# Patient Record
Sex: MALE | Race: BLACK OR AFRICAN AMERICAN | NOT HISPANIC OR LATINO | URBAN - METROPOLITAN AREA
[De-identification: names, ages, dates, MRNs, and addresses within clinical notes are randomized per-mention and may not be internally consistent; named-entity substitution may affect disease eponyms.]

---

## 2022-08-13 ENCOUNTER — HOSPITAL ENCOUNTER (EMERGENCY)
Facility: OTHER | Age: 32
Discharge: HOME | End: 2022-08-13
Attending: EMERGENCY MEDICINE
Payer: COMMERCIAL

## 2022-08-13 VITALS
SYSTOLIC BLOOD PRESSURE: 134 MMHG | TEMPERATURE: 98 F | BODY MASS INDEX: 45.1 KG/M2 | RESPIRATION RATE: 16 BRPM | DIASTOLIC BLOOD PRESSURE: 73 MMHG | OXYGEN SATURATION: 97 % | HEART RATE: 77 BPM | HEIGHT: 70 IN | WEIGHT: 315 LBS

## 2022-08-13 DIAGNOSIS — S61.213A LACERATION OF LEFT MIDDLE FINGER WITHOUT FOREIGN BODY WITHOUT DAMAGE TO NAIL, INITIAL ENCOUNTER: Primary | ICD-10-CM
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
## 2022-08-13 PROCEDURE — 99284 EMERGENCY DEPT VISIT MOD MDM: CPT

## 2022-08-13 PROCEDURE — 63600175 PHARM REV CODE 636 W HCPCS: Performed by: EMERGENCY MEDICINE

## 2022-08-13 PROCEDURE — 90715 TDAP VACCINE 7 YRS/> IM: CPT | Performed by: EMERGENCY MEDICINE

## 2022-08-13 PROCEDURE — 90471 IMMUNIZATION ADMIN: CPT | Performed by: EMERGENCY MEDICINE

## 2022-08-13 RX ORDER — AMOXICILLIN AND CLAVULANATE POTASSIUM 875; 125 MG/1; MG/1
1 TABLET, FILM COATED ORAL 2 TIMES DAILY
Qty: 8 TABLET | Refills: 0 | Status: SHIPPED | OUTPATIENT
Start: 2022-08-13 | End: 2022-08-17

## 2022-08-13 RX ADMIN — CLOSTRIDIUM TETANI TOXOID ANTIGEN (FORMALDEHYDE INACTIVATED), CORYNEBACTERIUM DIPHTHERIAE TOXOID ANTIGEN (FORMALDEHYDE INACTIVATED), BORDETELLA PERTUSSIS TOXOID ANTIGEN (GLUTARALDEHYDE INACTIVATED), BORDETELLA PERTUSSIS FILAMENTOUS HEMAGGLUTININ ANTIGEN (FORMALDEHYDE INACTIVATED), BORDETELLA PERTUSSIS PERTACTIN ANTIGEN, AND BORDETELLA PERTUSSIS FIMBRIAE 2/3 ANTIGEN 0.5 ML: 5; 2; 2.5; 5; 3; 5 INJECTION, SUSPENSION INTRAMUSCULAR at 01:08

## 2022-08-13 NOTE — ED PROVIDER NOTES
Encounter Date: 8/13/2022       History     Chief Complaint   Patient presents with    Laceration     C/o LAC to left middle finger s/p dog bite this am. 2 mm LAC noted to left middle finger. Bleeding controlled. Stated dog up to date with all vaccinations.     Animal Bite     The patient is a 32-year-old male who presents with a wound on his left middle finger just superior to the PIP joint dorsally.  No bleeding, the wound was caused by his dog biting him.  He states it is a Corgie and he was doing leash training.         Review of patient's allergies indicates:  No Known Allergies  History reviewed. No pertinent past medical history.  History reviewed. No pertinent surgical history.  History reviewed. No pertinent family history.     Review of Systems   Constitutional: Negative for activity change, appetite change and fever.   Endocrine: Negative for polydipsia and polyphagia.   Musculoskeletal: Negative for arthralgias and myalgias.       Physical Exam     Initial Vitals [08/13/22 1154]   BP Pulse Resp Temp SpO2   132/68 80 18 98.2 °F (36.8 °C) 96 %      MAP       --         Physical Exam    Nursing note and vitals reviewed.  Constitutional: He appears well-developed and well-nourished.   HENT:   Head: Normocephalic and atraumatic.   Musculoskeletal:      Comments: 1.5 cm laceration just superior to the left middle finger PIP joint.  When the finger is straight, the wound is slightly gaping open, there is no joint exposure, no extensor tendon exposure or damage.  Finger with full range of motion and neurovascularly intact.     Skin: Skin is warm and dry.   Psychiatric: He has a normal mood and affect. His behavior is normal. Judgment and thought content normal.         ED Course   Procedures  Labs Reviewed - No data to display       Imaging Results    None          Medications   Tdap vaccine injection 0.5 mL (has no administration in time range)                 ED Course as of 08/13/22 1339   Sat Aug 13, 2022    2534 Shared decision making was used.  I feel that the wound does not need to be repaired since it is a dog bite and is a fairly deep wound.  There is no joint exposure or extensor tendon damage however the wound will be thoroughly cleaned and irrigated in the emergency department, tetanus will be given in the patient will be discharged with a prescription for Augmentin.  The patient does not have a history of diabetes. [ST]      ED Course User Index  [ST] Kacy Orellana MD             Clinical Impression:   Final diagnoses:  [S61.213A] Laceration of left middle finger without foreign body without damage to nail, initial encounter (Primary)  [W54.0XXA] Dog bite, initial encounter          ED Disposition Condition    Discharge Stable        ED Prescriptions     Medication Sig Dispense Start Date End Date Auth. Provider    amoxicillin-clavulanate 875-125mg (AUGMENTIN) 875-125 mg per tablet Take 1 tablet by mouth 2 (two) times daily. for 4 days 8 tablet 8/13/2022 8/17/2022 Kacy Orellana MD        Follow-up Information     Follow up With Specialties Details Why Contact Info    [facility]  Schedule an appointment as soon as possible for a visit  [facility] Referral [facility] (Tel: [phone]) [address] [facility] [address]           Kacy Orellana MD  08/13/22 7616

## 2022-08-13 NOTE — ED NOTES
Reports dog bite to L hand, small lac to L digit, bleeding controlled. Dog up to date on vaccines. PT unsure of last tetanus. Hx of asthma

## 2022-08-13 NOTE — DISCHARGE INSTRUCTIONS
Take Augmentin twice a day for 4 days.  Return to the emergency department for any infection.  This includes redness, swelling, yellow milky drainage, increased pain.   Keep the wound clean and dry and covered